# Patient Record
Sex: FEMALE | Race: WHITE | URBAN - METROPOLITAN AREA
[De-identification: names, ages, dates, MRNs, and addresses within clinical notes are randomized per-mention and may not be internally consistent; named-entity substitution may affect disease eponyms.]

---

## 2018-10-01 ENCOUNTER — IMPORTED ENCOUNTER (OUTPATIENT)
Dept: URBAN - METROPOLITAN AREA CLINIC 50 | Facility: CLINIC | Age: 53
End: 2018-10-01

## 2018-11-01 ENCOUNTER — IMPORTED ENCOUNTER (OUTPATIENT)
Dept: URBAN - METROPOLITAN AREA CLINIC 50 | Facility: CLINIC | Age: 53
End: 2018-11-01

## 2018-11-01 NOTE — PATIENT DISCUSSION
"""Will order trials. Patient to  trials and call with progress to finalize.  Instructed patient to discontinue contact lens wear and call office immediately if pain/discomfort

## 2018-12-06 ENCOUNTER — IMPORTED ENCOUNTER (OUTPATIENT)
Dept: URBAN - METROPOLITAN AREA CLINIC 50 | Facility: CLINIC | Age: 53
End: 2018-12-06

## 2019-02-05 ENCOUNTER — IMPORTED ENCOUNTER (OUTPATIENT)
Dept: URBAN - METROPOLITAN AREA CLINIC 50 | Facility: CLINIC | Age: 54
End: 2019-02-05

## 2019-04-26 ENCOUNTER — IMPORTED ENCOUNTER (OUTPATIENT)
Dept: URBAN - METROPOLITAN AREA CLINIC 50 | Facility: CLINIC | Age: 54
End: 2019-04-26

## 2019-05-10 ENCOUNTER — IMPORTED ENCOUNTER (OUTPATIENT)
Dept: URBAN - METROPOLITAN AREA CLINIC 50 | Facility: CLINIC | Age: 54
End: 2019-05-10

## 2019-05-24 ENCOUNTER — IMPORTED ENCOUNTER (OUTPATIENT)
Dept: URBAN - METROPOLITAN AREA CLINIC 50 | Facility: CLINIC | Age: 54
End: 2019-05-24

## 2019-05-24 NOTE — PATIENT DISCUSSION
"""Not visually significant OU.  Patient interested in CE due to becoming intolerable to current MF ""

## 2019-06-19 ENCOUNTER — IMPORTED ENCOUNTER (OUTPATIENT)
Dept: URBAN - METROPOLITAN AREA CLINIC 50 | Facility: CLINIC | Age: 54
End: 2019-06-19

## 2019-06-25 ENCOUNTER — IMPORTED ENCOUNTER (OUTPATIENT)
Dept: URBAN - METROPOLITAN AREA CLINIC 50 | Facility: CLINIC | Age: 54
End: 2019-06-25

## 2019-06-25 NOTE — PATIENT DISCUSSION
"""Not visually significant OU.  Patient interested in CE due to becoming intolerable to current MF "" Ct and kUB moved to the week prior -patient only has transportation on Tuesday and Thursday

## 2019-06-25 NOTE — PATIENT DISCUSSION
"""Allergic Conjunctivitis OU. Continue Zaditor bid OU. Begin Lotemax bid OU x 1 month.  Continue ""

## 2019-06-26 ENCOUNTER — IMPORTED ENCOUNTER (OUTPATIENT)
Dept: URBAN - METROPOLITAN AREA CLINIC 50 | Facility: CLINIC | Age: 54
End: 2019-06-26

## 2019-07-23 ENCOUNTER — IMPORTED ENCOUNTER (OUTPATIENT)
Dept: URBAN - METROPOLITAN AREA CLINIC 50 | Facility: CLINIC | Age: 54
End: 2019-07-23

## 2019-07-23 NOTE — PATIENT DISCUSSION
"""Daily CL trials given for distance vision (order was placed on 04/26/19 visit).  Patient to call and ""

## 2020-07-23 ENCOUNTER — IMPORTED ENCOUNTER (OUTPATIENT)
Dept: URBAN - METROPOLITAN AREA CLINIC 50 | Facility: CLINIC | Age: 55
End: 2020-07-23

## 2020-07-23 NOTE — PATIENT DISCUSSION
"""Patient interested in CL will order trials and schedule patient CL exam next available with Dr. Edwards Spearing

## 2020-09-28 ENCOUNTER — IMPORTED ENCOUNTER (OUTPATIENT)
Dept: URBAN - METROPOLITAN AREA CLINIC 50 | Facility: CLINIC | Age: 55
End: 2020-09-28

## 2021-04-17 ASSESSMENT — TONOMETRY
OD_IOP_MMHG: 16
OS_IOP_MMHG: 18
OD_IOP_MMHG: 18
OD_IOP_MMHG: 16
OD_IOP_MMHG: 16
OS_IOP_MMHG: 15
OD_IOP_MMHG: 16
OS_IOP_MMHG: 16
OD_IOP_MMHG: 16
OD_IOP_MMHG: 16
OS_IOP_MMHG: 15
OS_IOP_MMHG: 15
OS_IOP_MMHG: 17
OS_IOP_MMHG: 16

## 2021-04-17 ASSESSMENT — VISUAL ACUITY
OS_CC: J1+@ 14 IN
OD_CC: J1+@ 16 IN
OS_BAT: 20/40
OS_CC: J1+@ 16 IN
OS_CC: 20/20
OS_CC: J2
OD_CC: J1+@ 14 IN
OS_CC: 20/20
OS_CC: 20/20-2
OD_CC: 20/20
OS_CC: 20/20
OD_CC: 20/25-1
OD_CC: 20/25+
OD_CC: 20/20
OS_CC: 20/20
OD_OTHER: 20/30. 20/50.
OD_BAT: 20/60
OS_CC: 20/20
OS_CC: 20/20
OS_OTHER: 20/30. 20/50.
OS_OTHER: 20/40. 20/50.
OD_CC: 20/20
OD_BAT: 20/30
OS_BAT: 20/30
OS_CC: 20/20
OD_CC: J2
OD_CC: 20/20
OD_OTHER: 20/60. 20/80.
OS_CC: 20/20
OD_CC: 20/20

## 2022-06-14 ENCOUNTER — PREPPED CHART (OUTPATIENT)
Dept: URBAN - METROPOLITAN AREA CLINIC 48 | Facility: CLINIC | Age: 57
End: 2022-06-14

## 2022-06-20 ENCOUNTER — COMPREHENSIVE EXAM (OUTPATIENT)
Dept: URBAN - METROPOLITAN AREA CLINIC 48 | Facility: CLINIC | Age: 57
End: 2022-06-20

## 2022-06-20 DIAGNOSIS — H04.123: ICD-10-CM

## 2022-06-20 DIAGNOSIS — H25.13: ICD-10-CM

## 2022-06-20 DIAGNOSIS — H52.13: ICD-10-CM

## 2022-06-20 DIAGNOSIS — D31.31: ICD-10-CM

## 2022-06-20 DIAGNOSIS — H40.013: ICD-10-CM

## 2022-06-20 PROCEDURE — 92015 DETERMINE REFRACTIVE STATE: CPT

## 2022-06-20 PROCEDURE — 92014 COMPRE OPH EXAM EST PT 1/>: CPT

## 2022-06-20 ASSESSMENT — VISUAL ACUITY
OS_CC: 20/20
OU_SC: J1+
OD_GLARE: 20/20
OD_CC: 20/20
OU_CC: 20/20
OS_GLARE: 20/20
OD_GLARE: 20/20
OU_CC: J2
OS_GLARE: 20/20

## 2022-06-20 ASSESSMENT — TONOMETRY
OS_IOP_MMHG: 14
OD_IOP_MMHG: 14

## 2022-08-02 ENCOUNTER — DIAGNOSTICS ONLY (OUTPATIENT)
Dept: URBAN - METROPOLITAN AREA CLINIC 48 | Facility: LOCATION | Age: 57
End: 2022-08-02

## 2022-08-02 DIAGNOSIS — H40.013: ICD-10-CM

## 2022-08-02 PROCEDURE — 92083 EXTENDED VISUAL FIELD XM: CPT

## 2022-08-02 PROCEDURE — 92133 CPTRZD OPH DX IMG PST SGM ON: CPT

## 2023-06-26 ENCOUNTER — COMPREHENSIVE EXAM (OUTPATIENT)
Dept: URBAN - METROPOLITAN AREA CLINIC 48 | Facility: LOCATION | Age: 58
End: 2023-06-26

## 2023-06-26 DIAGNOSIS — H40.013: ICD-10-CM

## 2023-06-26 DIAGNOSIS — H25.13: ICD-10-CM

## 2023-06-26 PROCEDURE — 92014 COMPRE OPH EXAM EST PT 1/>: CPT

## 2023-06-26 ASSESSMENT — VISUAL ACUITY
OD_CC: 20/20
OS_CC: 20/20
OD_GLARE: 20/20
OU_CC: 20/20
OU_CC: J2
OS_GLARE: 20/20
OU_SC: J1+

## 2023-06-26 ASSESSMENT — KERATOMETRY
OD_AXISANGLE_DEGREES: 25
OS_K1POWER_DIOPTERS: 44.50
OD_AXISANGLE2_DEGREES: 115
OD_K1POWER_DIOPTERS: 44.00
OD_K2POWER_DIOPTERS: 44.50
OS_AXISANGLE2_DEGREES: 70
OS_K2POWER_DIOPTERS: 45.25
OS_AXISANGLE_DEGREES: 160

## 2023-06-26 ASSESSMENT — TONOMETRY
OD_IOP_MMHG: 15
OS_IOP_MMHG: 16

## 2024-07-18 ENCOUNTER — COMPREHENSIVE EXAM (OUTPATIENT)
Dept: URBAN - METROPOLITAN AREA CLINIC 49 | Facility: LOCATION | Age: 59
End: 2024-07-18

## 2024-07-18 DIAGNOSIS — H52.4: ICD-10-CM

## 2024-07-18 DIAGNOSIS — H25.13: ICD-10-CM

## 2024-07-18 DIAGNOSIS — D31.31: ICD-10-CM

## 2024-07-18 DIAGNOSIS — H04.123: ICD-10-CM

## 2024-07-18 DIAGNOSIS — H40.023: ICD-10-CM

## 2024-07-18 PROCEDURE — 92015 DETERMINE REFRACTIVE STATE: CPT

## 2024-07-18 PROCEDURE — 99214 OFFICE O/P EST MOD 30 MIN: CPT

## 2024-07-18 PROCEDURE — 92133 CPTRZD OPH DX IMG PST SGM ON: CPT

## 2024-07-18 PROCEDURE — 76514 ECHO EXAM OF EYE THICKNESS: CPT

## 2024-07-18 PROCEDURE — 92083 EXTENDED VISUAL FIELD XM: CPT

## 2024-07-18 ASSESSMENT — VISUAL ACUITY
OU_CC: J2@18"
OS_SC: 20/400
OU_CC: 20/20
OS_CC: 20/20-2
OU_SC: J1+@18"
OU_SC: 20/100
OD_CC: 20/30-2
OD_SC: 20/400

## 2024-07-18 ASSESSMENT — TONOMETRY
OS_IOP_MMHG: 18
OS_IOP_MMHG: 20
OD_IOP_MMHG: 19
OD_IOP_MMHG: 20

## 2024-07-18 ASSESSMENT — PACHYMETRY
OS_CT_UM: 519
OD_CT_UM: 526

## 2024-09-19 ENCOUNTER — CONTACT LENSES/GLASSES VISIT (OUTPATIENT)
Dept: URBAN - METROPOLITAN AREA CLINIC 49 | Facility: LOCATION | Age: 59
End: 2024-09-19

## 2024-09-19 DIAGNOSIS — Z97.3: ICD-10-CM

## 2024-09-19 PROCEDURE — 92310-3E ESTABLISHED CL PATIENT MULTIFOCAL AND/OR MONOVISION SOFT LENS EVALUATION
